# Patient Record
Sex: MALE | Race: WHITE
[De-identification: names, ages, dates, MRNs, and addresses within clinical notes are randomized per-mention and may not be internally consistent; named-entity substitution may affect disease eponyms.]

---

## 2018-07-15 ENCOUNTER — HOSPITAL ENCOUNTER (INPATIENT)
Dept: HOSPITAL 74 - YASAS | Age: 43
LOS: 4 days | Discharge: HOME | End: 2018-07-19
Attending: SURGERY | Admitting: SURGERY
Payer: COMMERCIAL

## 2018-07-15 VITALS — BODY MASS INDEX: 28.3 KG/M2

## 2018-07-15 DIAGNOSIS — E87.6: ICD-10-CM

## 2018-07-15 DIAGNOSIS — G25.1: ICD-10-CM

## 2018-07-15 DIAGNOSIS — F41.9: ICD-10-CM

## 2018-07-15 DIAGNOSIS — F10.230: Primary | ICD-10-CM

## 2018-07-15 DIAGNOSIS — Z87.09: ICD-10-CM

## 2018-07-15 DIAGNOSIS — Z86.19: ICD-10-CM

## 2018-07-15 DIAGNOSIS — Z86.69: ICD-10-CM

## 2018-07-15 DIAGNOSIS — Z88.8: ICD-10-CM

## 2018-07-15 DIAGNOSIS — R03.0: ICD-10-CM

## 2018-07-15 PROCEDURE — HZ2ZZZZ DETOXIFICATION SERVICES FOR SUBSTANCE ABUSE TREATMENT: ICD-10-PCS | Performed by: SURGERY

## 2018-07-15 RX ADMIN — Medication SCH MG: at 22:13

## 2018-07-15 RX ADMIN — Medication PRN MG: at 22:15

## 2018-07-15 NOTE — HP
CIWA Score





- CIWA Score


Nausea/Vomitin-No Nausea/No Vomiting


Muscle Tremors: 4-Moderate,w/Arms Extend


Anxiety: 3


Agitation: 4-Moderately Restless


Paroxysmal Sweats: 2


Orientation: 1-Uncertain about Date (no distress)


Tacttile Disturbances: 2-Mild Itch/Numbness/Burn


Auditory Disturbances: 0-None


Visual Disturbances: 0-None


Headache: 0-None Present


CIWA-Ar Total Score: 16





Admission ROS S





- HPI


Chief Complaint: 





alcohol withdrawal sx 


Allergies/Adverse Reactions: 


 Allergies











Allergy/AdvReac Type Severity Reaction Status Date / Time


 


cyclobenzaprine Allergy  Rash Verified 07/15/18 15:39





[From Flexeril]     











History of Present Illness: 





44 yo male with hx chronic alcohol dependence is here seeking detox. Last detox 

[facility] 3 years ago. PMHX: Scoliois, anxiety, panic d/o. Reports non-

adherence with medications and is unable to recall mastications currently 

taking. Denies suicidal / homicidal ideation. Denies hx of suicide attempt. 

Patient report reports was at [facility] last night and was discharged 

today. Reports remote hx of alcohol withdrawal seizure. Longest period of 

sobriety 4 years. 


Exam Limitations: No Limitations





- Ebola screening


Have you traveled outside of the country in the last 21 days: No (N)


Have you had contact with anyone from an Ebola affected area: No


Have you been sick,other than usual withdrawal symptoms: No


Do you have a fever: No





- Review of Systems


Constitutional: Chills, Diaphoresis, Loss of Appetite, Changes in sleep, 

Unintentional Wgt. Loss (15 lbs, " I simply do not eat")


EENT: reports: No Symptoms Reported


Respiratory: reports: No Symptoms reported


Cardiac: reports: No Symptoms Reported


GI: reports: Poor Appetite, Poor Fluid Intake


: reports: No Symptoms Reported


Musculoskeletal: reports: Back Pain, Joint Pain


Integumentary: reports: Rash (both hands attributes to takking muscle relaxer)


Neuro: reports: Tremors (both hands)


Endocrine: reports: Increased Thirst


Hematology: reports: No Symptoms Reported


Psychiatric: reports: Orientated x3, Anxious


Other Systems: Reviewed and Negative





Patient History





- Patient Medical History


Hx Anemia: No


Hx Asthma: Yes (Childhood asthma (resolved))


Hx Chronic Obstructive Pulmonary Disease (COPD): No


Hx Cancer: No


Hx Cardiac Disorders: No


Hx Congestive Heart Failure: No


Hx Hypertension: No


Hx Hypercholesterolemia: No


Hx Pacemaker: No


HX Cerebrovascular Accident: No


Hx Seizures: Yes (last seizure more than 5 years ago)


Hx Dementia: No


Hx Diabetes: No


Hx Gastrointestinal Disorders: No


Hx Liver Disease: Yes (elevated LFTs )


Hx Genitourinary Disorders: No


Hx Sexually Transmitted Disorders: Yes (Hx of "crabs")


Hx Renal Disease (ESRD): No


Hx Thyroid Disease: No


Hx Human Immunodeficiency Virus (HIV): No (last tested 6 years )


Hx Hepatitis C: No


Hx Depression: Yes


Hx Suicide Attempt: No


Hx Bipolar Disorder: No


Hx Schizophrenia: No





- Patient Surgical History


Past Surgical History: Yes


Hx Neurologic Surgery: No


Hx Cataract Extraction: No


Hx Cardiac Surgery: No


Hx Lung Surgery: No


Hx Breast Surgery: No


Hx Breast Biopsy: No


Hx Abdominal Surgery: No


Hx Appendectomy: No


Hx Cholecystectomy: No


Hx Genitourinary Surgery: No


Hx  Section: No


Hx Orthopedic Surgery: Yes (Scoliosis Surgery x 2  & )


Anesthesia Reaction: No





- PPD History


Previous Implant?: Yes ("a long time ago")


Documented Results: Negative w/o proof


Implanted On Prior R Admission?: No


PPD to be Administered?: Yes





- Smoking Cessation


Smoking history: Never smoked


Have you smoked in the past 12 months: No


Hx Chewing Tobacco Use: No


Initiated information on smoking cessation: No





- Substance & Tx. History


Hx Alcohol Use: Yes


Hx Substance Use: Yes


Substance Use Type: Alcohol


Hx Substance Use Treatment: Yes (Last detox [facility] 3 years ago.)





- Substances Abused


  ** Alcohol


Route: Oral


Frequency: Daily


Amount used: 10 Beers/daily


Age of first use: 10


Date of Last Use: 17





Family Disease History





- Family Disease History


Family Disease History: CA: Mother (, alcoholism, Hep C, Colon CA ), 

Other: Father (, alcoholism), Mother





Admission Physical Exam [facility]





- Vital Signs


Vital Signs: 


 Vital Signs - 24 hr











  07/15/18





  14:30


 


Temperature 96.8 F L


 


Pulse Rate 104 H


 


Respiratory 18





Rate 


 


Blood Pressure 150/90














- Physical


General Appearance: Yes: Disheveled, Moderate Distress, Tremorous, Sweating, 

Anxious


HEENTM: Yes: Hearing grossly Normal, Normal ENT Inspection, Normocephalic, 

Normal Voice, CHAPARRO, Pharynx Normal, Tm's normal


Respiratory: Yes: Chest Non-Tender, Lungs Clear, Normal Breath Sounds, No 

Respiratory Distress, No Accessory Muscle Use


Neck: Yes: Within Normal Limits


Breast: Yes: Breast Exam Deferred


Cardiology: Yes: Regular Rhythm, Tachycardia


Abdominal: Yes: Normal Bowel Sounds, Non Tender, Soft, Protuberent


Genitourinary: Yes: Within Normal Limits


Back: Yes: Normal Inspection


Musculoskeletal: Yes: full range of Motion, Gait Steady, Pelvis Stable, Back 

pain


Extremities: Yes: Normal Capillary Refill, Normal Inspection, Normal Range of 

Motion


Neurological: Yes: CNs II-XII NML intact, Fully Oriented, Alert, Motor Strength 

5/5, Depressed Affect


Integumentary: Yes: Normal Color, Warm, Diaphoresis, Rash


Lymphatic: Yes: Within Normal Limits





- Diagnostic


(1) Alcohol dependence with withdrawal


Current Visit: Yes   Status: Acute   


Qualifiers: 


   Complication of substance-induced condition: uncomplicated   Qualified Code(s

): F10.230 - Alcohol dependence with withdrawal, uncomplicated   





(2) Tremor due to drug withdrawal


Current Visit: Yes   Status: Acute   





(3) Elevated blood pressure reading without diagnosis of hypertension


Current Visit: Yes   Status: Acute   





(4) History of seizure


Current Visit: Yes   Status: Chronic   





(5) Anxious mood


Current Visit: Yes   Status: Acute   





Cleared for Admission [facility]





- Detox or Rehab


[facility] Level of Care: Medically Managed


Detox Regimen/Protocol: Librium





BHS Breath Alcohol Content


Breath Alcohol Content: 0.195





Urine Drug Screen





- Results


Drug Screen Negative: No


Urine Drug Screen Results: BZO-Benzodiazepines, TCA-Tricyclic Antidepress

## 2018-07-16 LAB
ALBUMIN SERPL-MCNC: 4 G/DL (ref 3.4–5)
ALP SERPL-CCNC: 178 U/L (ref 45–117)
ALT SERPL-CCNC: 34 U/L (ref 12–78)
ANION GAP SERPL CALC-SCNC: 10 MMOL/L (ref 8–16)
APPEARANCE UR: (no result)
AST SERPL-CCNC: 56 U/L (ref 15–37)
BILIRUB SERPL-MCNC: 1.4 MG/DL (ref 0.2–1)
BILIRUB UR STRIP.AUTO-MCNC: NEGATIVE MG/DL
BUN SERPL-MCNC: 5 MG/DL (ref 7–18)
CALCIUM SERPL-MCNC: 8.6 MG/DL (ref 8.5–10.1)
CHLORIDE SERPL-SCNC: 99 MMOL/L (ref 98–107)
CO2 SERPL-SCNC: 28 MMOL/L (ref 21–32)
COLOR UR: YELLOW
CREAT SERPL-MCNC: 0.7 MG/DL (ref 0.7–1.3)
DEPRECATED RDW RBC AUTO: 16.7 % (ref 11.9–15.9)
GLUCOSE SERPL-MCNC: 102 MG/DL (ref 74–106)
HCT VFR BLD CALC: 35.9 % (ref 35.4–49)
HGB BLD-MCNC: 12.1 GM/DL (ref 11.7–16.9)
KETONES UR QL STRIP: NEGATIVE
LEUKOCYTE ESTERASE UR QL STRIP.AUTO: NEGATIVE
MCH RBC QN AUTO: 30.9 PG (ref 25.7–33.7)
MCHC RBC AUTO-ENTMCNC: 33.6 G/DL (ref 32–35.9)
MCV RBC: 92 FL (ref 80–96)
NITRITE UR QL STRIP: NEGATIVE
PH UR: 5 [PH] (ref 5–8)
PLATELET # BLD AUTO: 47 K/MM3 (ref 134–434)
PMV BLD: 10.1 FL (ref 7.5–11.1)
POTASSIUM SERPLBLD-SCNC: 3.1 MMOL/L (ref 3.5–5.1)
PROT SERPL-MCNC: 8.1 G/DL (ref 6.4–8.2)
PROT UR QL STRIP: NEGATIVE
PROT UR QL STRIP: NEGATIVE
RBC # BLD AUTO: 3.91 M/MM3 (ref 4–5.6)
SODIUM SERPL-SCNC: 137 MMOL/L (ref 136–145)
SP GR UR: 1.02 (ref 1–1.03)
UROBILINOGEN UR STRIP-MCNC: NEGATIVE MG/DL (ref 0.2–1)
WBC # BLD AUTO: 3.4 K/MM3 (ref 4–10)

## 2018-07-16 RX ADMIN — Medication SCH MG: at 22:08

## 2018-07-16 RX ADMIN — POTASSIUM CHLORIDE SCH MEQ: 1500 TABLET, EXTENDED RELEASE ORAL at 14:44

## 2018-07-16 RX ADMIN — POTASSIUM CHLORIDE SCH MEQ: 1500 TABLET, EXTENDED RELEASE ORAL at 22:09

## 2018-07-16 RX ADMIN — Medication PRN MG: at 22:09

## 2018-07-16 RX ADMIN — Medication SCH TAB: at 10:45

## 2018-07-16 NOTE — EKG
Test Reason : 

Blood Pressure : ***/*** mmHG

Vent. Rate : 086 BPM     Atrial Rate : 086 BPM

   P-R Int : 112 ms          QRS Dur : 082 ms

    QT Int : 428 ms       P-R-T Axes : 029 005 -09 degrees

   QTc Int : 512 ms

 

*** POOR DATA QUALITY, INTERPRETATION MAY BE ADVERSELY AFFECTED

NORMAL SINUS RHYTHM

MODERATE VOLTAGE CRITERIA FOR LVH, MAY BE NORMAL VARIANT

PROLONGED QT

ABNORMAL ECG

WHEN COMPARED WITH ECG OF 15-JUL-2018 17:10,

NO SIGNIFICANT CHANGE IS SEEN

Confirmed by JOHNY BERG MD ([id number]) on 7/16/2018 1:33:10 PM

 

Referred By:             Confirmed By:JOHNY BERG MD

## 2018-07-16 NOTE — CONSULT
[facility] Psychiatric Consult





- Data


Date of interview: 07/16/18


Admission source: [facility]


Identifying data: Readmission to [facility] for this 42 y/o  male 

seeking detox treatment [address] for alcohol dependence.Patient is singke 

without chidren,domiciled,unemploted and supported on SSI benefits.


Substance Abuse History: Confirmed by the patient in this session.Smoking 

history: Never smoked.  Have you smoked in the past 12 months: No.  Hx Chewing 

Tobacco Use: No.  Initiated information on smoking cessation: No.  - Substance 

& Tx. History.  Hx Alcohol Use: Yes.  Hx Substance Use: Yes.  Substance Use Type

: Alcohol.  Hx Substance Use Treatment: Yes (Last detox [facility] 3 years 

ago.).  - Substances Abused.  ** Alcohol.  Route: Oral.  Frequency: Daily.  

Amount used: 10 Beers/daily.  Age of first use: 10.  Date of Last Use: 07/14/18


Medical History: Chronic lumbar pain,herniated discs,scolioisis,bronchial 

asthma (chidhood),hypertension,antecedent of withdrawal-related seizures and a 

history of back surgery.


Psychiatric History: History of one psychiatric hospitalization years ago (name 

of facility not recalled).Diagnosed with Panic Disorder.No recall of names of 

medications prescribed in the past.Patient states that he has been lost to 

follow up for several years.Distant history of psychiatric OPD care.Mr Monreal denies history of suicide attempts.


Physical/Sexual Abuse/Trauma History: Patient denies.


Additional Comment: Urine Drug Screen Results: BZO-Benzodiazepines, TCA-

Tricyclic Antidepressant.Noted.





Mental Status Exam





- Mental Status Exam


Alert and Oriented to: Time, Place, Person


Cognitive Function: Good


Patient Appearance: Well Groomed


Mood: Hopeful, Euthymic


Affect: Appropriate, Normal Range


Patient Behavior: Cooperative


Speech Pattern: Clear, Appropriate


Voice Loudness: Normal


Thought Process: Intact, Goal Oriented


Thought Disorder: Not Present


Hallucinations: Denies


Suicidal Ideation: Denies


Homicidal Ideation: Denies


Insight/Judgement: Poor


Sleep: Fair


Appetite: Good


Muscle strength/Tone: Normal


Gait/Station: Normal





Psychiatric Findings





- Problem List (Axis 1, 2,3)


(1) Alcohol dependence with withdrawal


Current Visit: Yes   Status: Acute   


Qualifiers: 


   Complication of substance-induced condition: uncomplicated   Qualified Code(s

): F10.230 - Alcohol dependence with withdrawal, uncomplicated   





- Initial Treatment Plan


Initial Treatment Plan: Psychoeducation.Sleep hygiene.Detoxification in progress

/.Observation.

## 2018-07-16 NOTE — PN
[facility] CIWA





- CIWA Score


Nausea/Vomitin


Muscle Tremors: 3


Anxiety: 3


Agitation: 4-Moderately Restless


Paroxysmal Sweats: 3


Orientation: 0-Oriented


Tacttile Disturbances: 3-Moderate Itch/Numb/Burn


Auditory Disturbances: 0-None


Visual Disturbances: 0-None


Headache: 0-None Present


CIWA-Ar Total Score: 18





BHS Progress Note (SOAP)


Subjective: 





Sleep disturbance


shakes


itchy


anxious


Objective: 





18 13:05


A & O x 3, no resp distress


Restless


Scratching


 Vital Signs











Temperature  99.3 F   18 10:56


 


Pulse Rate  74   18 10:56


 


Respiratory Rate  18   18 10:56


 


Blood Pressure  128/76   18 10:56


 


O2 Sat by Pulse Oximetry (%)      








 Laboratory Last Values











WBC  3.4 K/mm3 (4.0-10.0)  L  18  07:00    


 


RBC  3.91 M/mm3 (4.00-5.60)  L  18  07:00    


 


Hgb  12.1 GM/dL (11.7-16.9)   18  07:00    


 


Hct  35.9 % (35.4-49)   18  07:00    


 


MCV  92.0 fl (80-96)   18  07:00    


 


MCH  30.9 pg (25.7-33.7)   18  07:00    


 


MCHC  33.6 g/dl (32.0-35.9)   18  07:00    


 


RDW  16.7 % (11.9-15.9)  H  18  07:00    


 


Plt Count  47 K/MM3 (134-434)  L  18  07:00    


 


MPV  10.1 fl (7.5-11.1)   18  07:00    


 


Sodium  137 mmol/L (136-145)   18  07:00    


 


Potassium  3.1 mmol/L (3.5-5.1)  L  18  07:00    


 


Chloride  99 mmol/L ()   18  07:00    


 


Carbon Dioxide  28 mmol/L (21-32)   18  07:00    


 


Anion Gap  10  (8-16)   18  07:00    


 


BUN  5 mg/dL (7-18)  L  18  07:00    


 


Creatinine  0.7 mg/dL (0.7-1.3)   18  07:00    


 


Creat Clearance w eGFR  > 60  (>60)   18  07:00    


 


Random Glucose  102 mg/dL ()   18  07:00    


 


Calcium  8.6 mg/dL (8.5-10.1)   18  07:00    


 


Total Bilirubin  1.4 mg/dL (0.2-1.0)  H  18  07:00    


 


AST  56 U/L (15-37)  H  18  07:00    


 


ALT  34 U/L (12-78)   18  07:00    


 


Alkaline Phosphatase  178 U/L ()  H  18  07:00    


 


Total Protein  8.1 g/dl (6.4-8.2)   18  07:00    


 


Albumin  4.0 g/dl (3.4-5.0)   18  07:00    


 


HIV 1&2 Antibody Screen  Negative   18  07:00    


 


HIV P24 Antigen  Negative   18  07:00    





labs noted, low K+














Assessment: 





18 13:09


withdrawal sx








Plan: 





continue detox


increase hydration


potassium supplement

## 2018-07-16 NOTE — EKG
Test Reason : 

Blood Pressure : ***/*** mmHG

Vent. Rate : 097 BPM     Atrial Rate : 097 BPM

   P-R Int : 144 ms          QRS Dur : 092 ms

    QT Int : 378 ms       P-R-T Axes : 031 002 028 degrees

   QTc Int : 480 ms

 

NORMAL SINUS RHYTHM

MODERATE VOLTAGE CRITERIA FOR LVH, MAY BE NORMAL VARIANT

NONSPECIFIC ST AND T WAVE ABNORMALITY

PROLONGED QT

ABNORMAL ECG

WHEN COMPARED WITH ECG OF 15-JUL-2018 17:05,

PREVIOUS ECG HAS UNDETERMINED RHYTHM, NEEDS REVIEW

Confirmed by JOHNY BERG MD ([id number]) on 7/16/2018 1:36:18 PM

 

Referred By:             Confirmed By:JOHNY BERG MD

## 2018-07-17 RX ADMIN — POTASSIUM CHLORIDE SCH MEQ: 1500 TABLET, EXTENDED RELEASE ORAL at 10:40

## 2018-07-17 RX ADMIN — Medication SCH TAB: at 10:41

## 2018-07-17 RX ADMIN — Medication SCH MG: at 22:05

## 2018-07-17 RX ADMIN — POTASSIUM CHLORIDE SCH MEQ: 1500 TABLET, EXTENDED RELEASE ORAL at 22:05

## 2018-07-17 NOTE — PN
[facility] CIWA





- CIWA Score


Nausea/Vomitin-No Nausea/No Vomiting


Muscle Tremors: 3


Anxiety: 4-Mod. Anxious/Guarded


Agitation: 3


Paroxysmal Sweats: 4-Forehead w/Sweat Beads


Orientation: 0-Oriented


Tacttile Disturbances: 2-Mild Itch/Numbness/Burn


Auditory Disturbances: 0-None


Visual Disturbances: 0-None


Headache: 0-None Present


CIWA-Ar Total Score: 16





BHS Progress Note (SOAP)


Subjective: 





Tremors, Anxious, H/A.


Objective: 


PATIENT A & O X 3. NO ACUTE DISTRESS.





18 12:55


 Vital Signs











Temperature  98.2 F   18 09:34


 


Pulse Rate  84   18 09:34


 


Respiratory Rate  16   18 09:34


 


Blood Pressure  144/98   18 09:34


 


O2 Sat by Pulse Oximetry (%)      








 Laboratory Tests











  07/15/18 07/16/18 07/16/18





  16:44 07:00 07:00


 


WBC    3.4 L


 


RBC    3.91 L


 


Hgb    12.1


 


Hct    35.9


 


MCV    92.0


 


MCH    30.9


 


MCHC    33.6


 


RDW    16.7 H


 


Plt Count    47 L


 


MPV    10.1


 


Sodium   


 


Potassium   


 


Chloride   


 


Carbon Dioxide   


 


Anion Gap   


 


BUN   


 


Creatinine   


 


Creat Clearance w eGFR   


 


Random Glucose   


 


Calcium   


 


Total Bilirubin   


 


AST   


 


ALT   


 


Alkaline Phosphatase   


 


Total Protein   


 


Albumin   


 


Urine Color  Yellow  


 


Urine Appearance  Turbid  


 


Urine pH  5.0  


 


Ur Specific Gravity  1.018  


 


Urine Protein  Negative  


 


Urine Glucose (UA)  Negative  


 


Urine Ketones  Negative  


 


Urine Blood  Negative  


 


Urine Nitrite  Negative  


 


Urine Bilirubin  Negative  


 


Urine Urobilinogen  Negative  


 


Ur Leukocyte Esterase  Negative  


 


RPR Titer   


 


HIV 1&2 Antibody Screen   Negative 


 


HIV P24 Antigen   Negative 














  18





  07:00 07:00


 


WBC  


 


RBC  


 


Hgb  


 


Hct  


 


MCV  


 


MCH  


 


MCHC  


 


RDW  


 


Plt Count  


 


MPV  


 


Sodium  137 


 


Potassium  3.1 L 


 


Chloride  99 


 


Carbon Dioxide  28 


 


Anion Gap  10 


 


BUN  5 L 


 


Creatinine  0.7 


 


Creat Clearance w eGFR  > 60 


 


Random Glucose  102 


 


Calcium  8.6 


 


Total Bilirubin  1.4 H 


 


AST  56 H 


 


ALT  34 


 


Alkaline Phosphatase  178 H 


 


Total Protein  8.1 


 


Albumin  4.0 


 


Urine Color  


 


Urine Appearance  


 


Urine pH  


 


Ur Specific Gravity  


 


Urine Protein  


 


Urine Glucose (UA)  


 


Urine Ketones  


 


Urine Blood  


 


Urine Nitrite  


 


Urine Bilirubin  


 


Urine Urobilinogen  


 


Ur Leukocyte Esterase  


 


RPR Titer   Nonreactive


 


HIV 1&2 Antibody Screen  


 


HIV P24 Antigen  








LABS NOTED.


Assessment: 





18 12:56


WITHDRAWAL SYMPTOMS.


LEUKOPENIA.


THROMBOCYTOPENIA.


HYPOKALEMIA.





18 13:05





Plan: 





CONTINUE DETOX.


INCREASE DAILY PO FLUID INTAKE.


CLONIDINE, 0.1 MG PO X 1 FOR ELEVATED BP AND FOR WITHDRAWAL SYMPTOMS.


CONTINUE K-DUR.


HEPATIC FUNCTION PANEL TOMORROW AM FOR ABNORMAL ADMISSION LAB VALUES.

## 2018-07-18 LAB
ALBUMIN SERPL-MCNC: 4.3 G/DL (ref 3.4–5)
ALP SERPL-CCNC: 223 U/L (ref 45–117)
ALT SERPL-CCNC: 58 U/L (ref 12–78)
AST SERPL-CCNC: 88 U/L (ref 15–37)
BILIRUB CONJ SERPL-MCNC: 0.5 MG/DL (ref 0–0.2)
BILIRUB SERPL-MCNC: 1.1 MG/DL (ref 0.2–1)
PROT SERPL-MCNC: 9.2 G/DL (ref 6.4–8.2)

## 2018-07-18 RX ADMIN — POTASSIUM CHLORIDE SCH MEQ: 1500 TABLET, EXTENDED RELEASE ORAL at 10:56

## 2018-07-18 RX ADMIN — POTASSIUM CHLORIDE SCH MEQ: 1500 TABLET, EXTENDED RELEASE ORAL at 22:12

## 2018-07-18 RX ADMIN — Medication SCH TAB: at 10:56

## 2018-07-18 RX ADMIN — Medication PRN MG: at 22:12

## 2018-07-18 RX ADMIN — Medication SCH MG: at 22:12

## 2018-07-18 NOTE — PN
BHS Progress Note (SOAP)


Subjective: 





Interrupted Sleep, Anxious, Sweating.


Objective: 


PATIENT A & O X 3, OBSERVED AMBULATING ON UNIT. NO ACUTE DISTRESS.





07/18/18 15:01


 Vital Signs











Temperature  98.3 F   07/18/18 14:07


 


Pulse Rate  81   07/18/18 14:07


 


Respiratory Rate  20   07/18/18 14:07


 


Blood Pressure  152/100   07/18/18 14:07


 


O2 Sat by Pulse Oximetry (%)      








 Laboratory Tests











  07/15/18 07/16/18 07/16/18





  16:44 07:00 07:00


 


WBC    3.4 L


 


RBC    3.91 L


 


Hgb    12.1


 


Hct    35.9


 


MCV    92.0


 


MCH    30.9


 


MCHC    33.6


 


RDW    16.7 H


 


Plt Count    47 L


 


MPV    10.1


 


Sodium   


 


Potassium   


 


Chloride   


 


Carbon Dioxide   


 


Anion Gap   


 


BUN   


 


Creatinine   


 


Creat Clearance w eGFR   


 


Random Glucose   


 


Calcium   


 


Total Bilirubin   


 


Direct Bilirubin   


 


AST   


 


ALT   


 


Alkaline Phosphatase   


 


Total Protein   


 


Albumin   


 


Urine Color  Yellow  


 


Urine Appearance  Turbid  


 


Urine pH  5.0  


 


Ur Specific Gravity  1.018  


 


Urine Protein  Negative  


 


Urine Glucose (UA)  Negative  


 


Urine Ketones  Negative  


 


Urine Blood  Negative  


 


Urine Nitrite  Negative  


 


Urine Bilirubin  Negative  


 


Urine Urobilinogen  Negative  


 


Ur Leukocyte Esterase  Negative  


 


RPR Titer   


 


HIV 1&2 Antibody Screen   Negative 


 


HIV P24 Antigen   Negative 














  07/16/18 07/16/18 07/18/18





  07:00 07:00 07:00


 


WBC   


 


RBC   


 


Hgb   


 


Hct   


 


MCV   


 


MCH   


 


MCHC   


 


RDW   


 


Plt Count   


 


MPV   


 


Sodium  137  


 


Potassium  3.1 L  


 


Chloride  99  


 


Carbon Dioxide  28  


 


Anion Gap  10  


 


BUN  5 L  


 


Creatinine  0.7  


 


Creat Clearance w eGFR  > 60  


 


Random Glucose  102  


 


Calcium  8.6  


 


Total Bilirubin  1.4 H   1.1 H


 


Direct Bilirubin    0.5 H


 


AST  56 H   88 H D


 


ALT  34   58  D


 


Alkaline Phosphatase  178 H   223 H D


 


Total Protein  8.1   9.2 H


 


Albumin  4.0   4.3


 


Urine Color   


 


Urine Appearance   


 


Urine pH   


 


Ur Specific Gravity   


 


Urine Protein   


 


Urine Glucose (UA)   


 


Urine Ketones   


 


Urine Blood   


 


Urine Nitrite   


 


Urine Bilirubin   


 


Urine Urobilinogen   


 


Ur Leukocyte Esterase   


 


RPR Titer   Nonreactive 


 


HIV 1&2 Antibody Screen   


 


HIV P24 Antigen   








LABS NOTED.


Assessment: 





07/18/18 15:01


WITHDRAWAL SYMPTOMS.


HYPOKALEMIA.





07/18/18 15:03





Plan: 





CONTINUE DETOX.


INCREASE DAILY PO FLUID INTAKE,.


CONTINUE K-DUR.





PATIENT SCHEDULED FOR D/C TOMORROW.

## 2018-07-19 VITALS — TEMPERATURE: 97.5 F | HEART RATE: 92 BPM | SYSTOLIC BLOOD PRESSURE: 144 MMHG | DIASTOLIC BLOOD PRESSURE: 97 MMHG

## 2018-07-19 NOTE — DS
[facility] Detox Discharge Summary


Admission Date: 


07/15/18





Discharge Date: 07/19/18





- History


Present History: Alcohol Dependence


Additional Comments: 





PATIENT GOING TO [facility] ([place] FOR AFTERCARE. PATIENT 

ADVISED TO FOLLOW-UP WITH  ([place] AFTER DISCHARGE FROM DETOX UNIT FOR 

HISTORY OF ELEVATED LIVER ENZYMES AND FOR ABNORMAL LIVER ENZYME VALUES NOTED 

WHILE ADMITTED FOR DETOX. COPIES OF ALL LABS DRAWN WHILE ADMITTED FOR DETOX 

GIVEN TO PATIENT AT TIME OF DISCHARGE. PATIENT WAS DISCHARGED FROM DETOX UNIT 

IN STABLE MEDICAL CONDITION.


Pertinent Past History: 





Depression, History of Seizure, History of Elevated BP, Hypokalemia, History of 

Tremors during Alcohol Withdrawal, Anxiety, History of Elevated Liver Enzymes.





- Physical Exam Results


Vital Signs: 


 Vital Signs











Temperature  97.5 F L  07/19/18 08:30


 


Pulse Rate  92 H  07/19/18 08:30


 


Respiratory Rate  18   07/19/18 08:30


 


Blood Pressure  144/97   07/19/18 08:30


 


O2 Sat by Pulse Oximetry (%)      











Pertinent Admission Physical Exam Findings: 





WITHDRAWAL SYMPTOMS.





 Laboratory Tests











  07/15/18 07/16/18 07/16/18





  16:44 07:00 07:00


 


WBC    3.4 L


 


RBC    3.91 L


 


Hgb    12.1


 


Hct    35.9


 


MCV    92.0


 


MCH    30.9


 


MCHC    33.6


 


RDW    16.7 H


 


Plt Count    47 L


 


MPV    10.1


 


Sodium   


 


Potassium   


 


Chloride   


 


Carbon Dioxide   


 


Anion Gap   


 


BUN   


 


Creatinine   


 


Creat Clearance w eGFR   


 


Random Glucose   


 


Calcium   


 


Total Bilirubin   


 


Direct Bilirubin   


 


AST   


 


ALT   


 


Alkaline Phosphatase   


 


Total Protein   


 


Albumin   


 


Urine Color  Yellow  


 


Urine Appearance  Turbid  


 


Urine pH  5.0  


 


Ur Specific Gravity  1.018  


 


Urine Protein  Negative  


 


Urine Glucose (UA)  Negative  


 


Urine Ketones  Negative  


 


Urine Blood  Negative  


 


Urine Nitrite  Negative  


 


Urine Bilirubin  Negative  


 


Urine Urobilinogen  Negative  


 


Ur Leukocyte Esterase  Negative  


 


RPR Titer   


 


HIV 1&2 Antibody Screen   Negative 


 


HIV P24 Antigen   Negative 














  07/16/18 07/16/18 07/18/18





  07:00 07:00 07:00


 


WBC   


 


RBC   


 


Hgb   


 


Hct   


 


MCV   


 


MCH   


 


MCHC   


 


RDW   


 


Plt Count   


 


MPV   


 


Sodium  137  


 


Potassium  3.1 L  


 


Chloride  99  


 


Carbon Dioxide  28  


 


Anion Gap  10  


 


BUN  5 L  


 


Creatinine  0.7  


 


Creat Clearance w eGFR  > 60  


 


Random Glucose  102  


 


Calcium  8.6  


 


Total Bilirubin  1.4 H   1.1 H


 


Direct Bilirubin    0.5 H


 


AST  56 H   88 H D


 


ALT  34   58  D


 


Alkaline Phosphatase  178 H   223 H D


 


Total Protein  8.1   9.2 H


 


Albumin  4.0   4.3


 


Urine Color   


 


Urine Appearance   


 


Urine pH   


 


Ur Specific Gravity   


 


Urine Protein   


 


Urine Glucose (UA)   


 


Urine Ketones   


 


Urine Blood   


 


Urine Nitrite   


 


Urine Bilirubin   


 


Urine Urobilinogen   


 


Ur Leukocyte Esterase   


 


RPR Titer   Nonreactive 


 


HIV 1&2 Antibody Screen   


 


HIV P24 Antigen   








LABS NOTED.





- Treatment


Hospital Course: Detox Protocol Followed, Detoxed Safely, Responded well, 

Discharged Condition Good


Patient has Accepted a Rehab Referral to: PATIENT DORCAS TO [facility] ([place]





- Medication


Discharge Medications: 


Ambulatory Orders





Folic Acid PO DAILY MDD 1 MG 07/15/18 











- Diagnosis


(1) Alcohol dependence with withdrawal


Status: Acute   


Qualifiers: 


   Complication of substance-induced condition: uncomplicated   Qualified Code(s

): F10.230 - Alcohol dependence with withdrawal, uncomplicated   





(2) Anxious mood


Status: Acute   





(3) Elevated blood pressure reading without diagnosis of hypertension


Status: Acute   





(4) Tremor due to drug withdrawal


Status: Acute   





(5) History of seizure


Status: Chronic   





(6) Hypokalemia


Status: Acute   





- AMA


Did Patient Leave Against Medical Advice: No

## 2018-07-19 NOTE — PN
[facility] Progress Note (SOAP)


Subjective: 





Patient denies current Detox symptoms and reports that he feels well overall.


Objective: 


PATIENT A & O X 3, OBSERVED AMBULATING ON UNIT. NO ACUTE DISTRESS.





07/19/18 16:17


 Vital Signs











Temperature  97.5 F L  07/19/18 08:30


 


Pulse Rate  92 H  07/19/18 08:30


 


Respiratory Rate  18   07/19/18 08:30


 


Blood Pressure  144/97   07/19/18 08:30


 


O2 Sat by Pulse Oximetry (%)      








 Laboratory Tests











  07/15/18 07/16/18 07/16/18





  16:44 07:00 07:00


 


WBC    3.4 L


 


RBC    3.91 L


 


Hgb    12.1


 


Hct    35.9


 


MCV    92.0


 


MCH    30.9


 


MCHC    33.6


 


RDW    16.7 H


 


Plt Count    47 L


 


MPV    10.1


 


Sodium   


 


Potassium   


 


Chloride   


 


Carbon Dioxide   


 


Anion Gap   


 


BUN   


 


Creatinine   


 


Creat Clearance w eGFR   


 


Random Glucose   


 


Calcium   


 


Total Bilirubin   


 


Direct Bilirubin   


 


AST   


 


ALT   


 


Alkaline Phosphatase   


 


Total Protein   


 


Albumin   


 


Urine Color  Yellow  


 


Urine Appearance  Turbid  


 


Urine pH  5.0  


 


Ur Specific Gravity  1.018  


 


Urine Protein  Negative  


 


Urine Glucose (UA)  Negative  


 


Urine Ketones  Negative  


 


Urine Blood  Negative  


 


Urine Nitrite  Negative  


 


Urine Bilirubin  Negative  


 


Urine Urobilinogen  Negative  


 


Ur Leukocyte Esterase  Negative  


 


RPR Titer   


 


HIV 1&2 Antibody Screen   Negative 


 


HIV P24 Antigen   Negative 














  07/16/18 07/16/18 07/18/18





  07:00 07:00 07:00


 


WBC   


 


RBC   


 


Hgb   


 


Hct   


 


MCV   


 


MCH   


 


MCHC   


 


RDW   


 


Plt Count   


 


MPV   


 


Sodium  137  


 


Potassium  3.1 L  


 


Chloride  99  


 


Carbon Dioxide  28  


 


Anion Gap  10  


 


BUN  5 L  


 


Creatinine  0.7  


 


Creat Clearance w eGFR  > 60  


 


Random Glucose  102  


 


Calcium  8.6  


 


Total Bilirubin  1.4 H   1.1 H


 


Direct Bilirubin    0.5 H


 


AST  56 H   88 H D


 


ALT  34   58  D


 


Alkaline Phosphatase  178 H   223 H D


 


Total Protein  8.1   9.2 H


 


Albumin  4.0   4.3


 


Urine Color   


 


Urine Appearance   


 


Urine pH   


 


Ur Specific Gravity   


 


Urine Protein   


 


Urine Glucose (UA)   


 


Urine Ketones   


 


Urine Blood   


 


Urine Nitrite   


 


Urine Bilirubin   


 


Urine Urobilinogen   


 


Ur Leukocyte Esterase   


 


RPR Titer   Nonreactive 


 


HIV 1&2 Antibody Screen   


 


HIV P24 Antigen   








LABS NOTED.


Assessment: 





07/19/18 16:18


COMPLETION OF DETOX REGIMEN.


Plan: 





PATIENT SCHEDULED FOR DISCHARGE FROM DETOX UNIT TODAY.